# Patient Record
Sex: MALE | HISPANIC OR LATINO | URBAN - NONMETROPOLITAN AREA
[De-identification: names, ages, dates, MRNs, and addresses within clinical notes are randomized per-mention and may not be internally consistent; named-entity substitution may affect disease eponyms.]

---

## 2023-07-17 ENCOUNTER — WEB ENCOUNTER (OUTPATIENT)
Dept: URBAN - NONMETROPOLITAN AREA CLINIC 4 | Facility: CLINIC | Age: 33
End: 2023-07-17

## 2023-07-17 ENCOUNTER — OFFICE VISIT (OUTPATIENT)
Dept: URBAN - NONMETROPOLITAN AREA CLINIC 4 | Facility: CLINIC | Age: 33
End: 2023-07-17
Payer: COMMERCIAL

## 2023-07-17 ENCOUNTER — DASHBOARD ENCOUNTERS (OUTPATIENT)
Age: 33
End: 2023-07-17

## 2023-07-17 ENCOUNTER — LAB OUTSIDE AN ENCOUNTER (OUTPATIENT)
Dept: URBAN - NONMETROPOLITAN AREA CLINIC 4 | Facility: CLINIC | Age: 33
End: 2023-07-17

## 2023-07-17 VITALS
TEMPERATURE: 96.8 F | DIASTOLIC BLOOD PRESSURE: 114 MMHG | BODY MASS INDEX: 35.48 KG/M2 | HEART RATE: 72 BPM | HEIGHT: 71 IN | WEIGHT: 253.4 LBS | SYSTOLIC BLOOD PRESSURE: 157 MMHG

## 2023-07-17 DIAGNOSIS — K76.0 FATTY LIVER: ICD-10-CM

## 2023-07-17 DIAGNOSIS — R74.01 ELEVATED ALT MEASUREMENT: ICD-10-CM

## 2023-07-17 PROBLEM — 197321007: Status: ACTIVE | Noted: 2023-07-17

## 2023-07-17 PROBLEM — 162864005: Status: ACTIVE | Noted: 2023-07-17

## 2023-07-17 PROBLEM — 414916001: Status: ACTIVE | Noted: 2023-07-17

## 2023-07-17 PROCEDURE — 99204 OFFICE O/P NEW MOD 45 MIN: CPT | Performed by: INTERNAL MEDICINE

## 2023-07-17 NOTE — HPI-TODAY'S VISIT:
7/17/23: Pt is a 34 yo male with no significant PMH whois self referred for evaluation of fatty liver.  Pt seen at [facility] ED on 6/12/23 with c/o RLQ pain. CBC showed a normal white count of 9.6 and hemoglobin of 15.6. The patient's UA did have moderate amount of blood and 11-15 red blood cells. Trace bacteria, but moderate squamous epithelial cells. Creatinine was 1.3 with a glucose of 145. Sodium 139, potassium 3.6, AST 22, ALT 73, Tbili 0.30, Alk phos 105. Lipase wnl.   CT A/P IMPRESSION: Right-sided obstructive uropathy with mild perinephric and periureteral stranding that could be infectious or inflammatory nature. There is a 2.8 mm calcification near the inner margin of the right UVJ. Nonobstructive left side nephrolith and probable left renal cysts although 2 lesions are too small for definitive characterization. Follow-up as warranted. Fatty infiltration of liver.  Per labs 4/23/22, AST 17, ALT 25, Tbili and Alk phos wnl, HCV Ab negative.  No personal or FHx of chronic liver disease. Drinks alcohol occasionally. Denies IVDU. Denies any current GI complaints.

## 2023-07-18 LAB
ALBUMIN/GLOBULIN RATIO: 1.6
ALBUMIN: 4.5
ALKALINE PHOSPHATASE: 87
ALT: 52
AST: 18
BILIRUBIN, TOTAL: 0.4
BUN/CREATININE RATIO: (no result)
CALCIUM: 10
CARBON DIOXIDE: 26
CHLORIDE: 102
CREATININE: 1.01
EGFR: 101
FIB 4 INDEX: 0.28
GLOBULIN: 2.8
GLUCOSE: 105
PLATELET COUNT: 297
POTASSIUM: 4.5
PROTEIN, TOTAL: 7.3
SODIUM: 138
UREA NITROGEN (BUN): 11